# Patient Record
Sex: FEMALE | Race: BLACK OR AFRICAN AMERICAN | NOT HISPANIC OR LATINO
[De-identification: names, ages, dates, MRNs, and addresses within clinical notes are randomized per-mention and may not be internally consistent; named-entity substitution may affect disease eponyms.]

---

## 2020-01-27 PROBLEM — Z00.00 ENCOUNTER FOR PREVENTIVE HEALTH EXAMINATION: Status: ACTIVE | Noted: 2020-01-27

## 2020-02-13 ENCOUNTER — APPOINTMENT (OUTPATIENT)
Dept: GASTROENTEROLOGY | Facility: CLINIC | Age: 60
End: 2020-02-13
Payer: COMMERCIAL

## 2020-02-13 VITALS
HEIGHT: 64 IN | WEIGHT: 161 LBS | SYSTOLIC BLOOD PRESSURE: 130 MMHG | HEART RATE: 81 BPM | DIASTOLIC BLOOD PRESSURE: 90 MMHG | BODY MASS INDEX: 27.49 KG/M2

## 2020-02-13 DIAGNOSIS — R14.0 ABDOMINAL DISTENSION (GASEOUS): ICD-10-CM

## 2020-02-13 DIAGNOSIS — R10.9 UNSPECIFIED ABDOMINAL PAIN: ICD-10-CM

## 2020-02-13 PROCEDURE — 99215 OFFICE O/P EST HI 40 MIN: CPT

## 2020-02-13 NOTE — HISTORY OF PRESENT ILLNESS
[FreeTextEntry1] : recent onset of abdominal pain somewhat variable, upper abdomen, infrequently lower abdomen, no change in bowel habits or constipation, occasionally some heartburn, and a variable intensity. Will occur once. No change in appetite or weight loss or new medications.\par Tends to have regular bowel movements except when she takes he comes Probably due to calcium carbonate.\par \par Colonoscopy performed May 26, 2015 and was negative. I had recommended at that time a followup colonoscopy at a 7-10 year interval\par no specific symptoms highly suggestive of reflux although she tends to have some heartburn and also some upper abdominal pain and burning when she has gone several hours longer without eating. This may occur from her work schedule, but she does seemed to do better when she eats on a more regular basis

## 2020-02-13 NOTE — ASSESSMENT
[FreeTextEntry1] : 1.  recent onset of upper gi symptoms, with variable upper abdominal discomfort, some occasional heartburn, symptoms are worse when she hasn’t eaten, and burning in mid abdomen, and she feels better if she eats more regularly\par 2. has had gyn surgery for a cyst, ovarian, but no hysterectomy\par 3.  saw her gyn, dr Lauren Davila, and the exam was negative...no imaging done\par \par plan\par 1.  abdominal and pelvic ultrasound, because of symptoms\par 2.  also, arrange egd\par 3.  more fiber in diet...\par DR GREEN'S COMMON SENSE RECOMMENDATIONS FOR IMPROVING CONSTIPATION,  WITH OR WITHOUT PRESCRIPTION MEDICATION\par \par 1.  slowly increase dietary fiber\par 2.  breakfast is especially important for good bowel regime\par 3.  high fiber breakfast cereal such as Kashi or Fiber 1 is a useful way to increase dietary fiber\par 4.  hot beverage or hot cereal may also be helpful at breakfast\par 5.  fluid intake to avoid dehydration;  eight glasses of non caffeinated, non alcoholic fluids per day [64 fll oz]\par 6.  prunes can be helpful; 3-6 per day [alternative is prune juice]\par 7.  add unprocessed bran to foods, beginning with one teaspoon per day\par 8   add flaxseed to foods, starting with one tablespoon and increasing slowly\par \par patient seems to have a lot of veggies...\par she feels she is overkilling on fiber recently\par \par i am advising egd\par \par could be ibs\par try PROBIOTIC..ALIGN, OR CULTURELL, OR DIGESTIVE ADVANTAGE, OR FLORASTOR\par \par TRY IBGARD, which is a tincture of peppermint, and it can be soothing, start one capsule three times per day, and increase up to two capsules three times per day\par \par set up EGD or gastroscopy\par \par but the ultrasound first\par \par AS WE OBTAIN INFORMED CONSENT FOR COLONOSCOPY, UPPER ENDOSCOPY [EGD], OR BOTH PROCEDURES TOGETHER;\par \par As with all procedures, there are risks of which the patient should be aware\par \par 1.  Anesthesia; deep sedation with Propofol;  there is a small risk of aspiration and pulmonary infection.  The Anesthesiologist meets with the patient the morning of the procedure to discuss in more detail\par \par 2.  risk of bleeding; from removal of a polyp, or rarely, from biopsies, 1 % or less\par \par 3.  risk of injury or perforation of the colon or upper GI tract; one in a thousand or less,  from removing a polyp or from advancing the instrument\par \par \par More than 50% of the face to face time was devoted to counseling and /or coordination of care.  THis coordination of care may have included reviewing other medical notes and reports, and communicating with other health professionals\par \par \par

## 2020-02-13 NOTE — PHYSICAL EXAM
[Bowel Sounds] : normal bowel sounds [Abdomen Soft] : soft [Abdomen Tenderness] : non-tender [] : no hepato-splenomegaly [Abdomen Mass (___ Cm)] : no abdominal mass palpated [Normal Sphincter Tone] : normal sphincter tone [No Rectal Mass] : no rectal mass [Occult Blood Positive] : stool was negative for occult blood [FreeTextEntry1] : stool is rather considerable but not hard, in ampulla, heme negative

## 2020-02-21 ENCOUNTER — RESULT REVIEW (OUTPATIENT)
Age: 60
End: 2020-02-21

## 2020-03-03 ENCOUNTER — RECORD ABSTRACTING (OUTPATIENT)
Age: 60
End: 2020-03-03

## 2020-03-03 DIAGNOSIS — Z82.49 FAMILY HISTORY OF ISCHEMIC HEART DISEASE AND OTHER DISEASES OF THE CIRCULATORY SYSTEM: ICD-10-CM

## 2020-03-03 DIAGNOSIS — Z78.9 OTHER SPECIFIED HEALTH STATUS: ICD-10-CM

## 2020-03-03 DIAGNOSIS — Z83.3 FAMILY HISTORY OF DIABETES MELLITUS: ICD-10-CM

## 2020-03-03 DIAGNOSIS — C50.919 MALIGNANT NEOPLASM OF UNSPECIFIED SITE OF UNSPECIFIED FEMALE BREAST: ICD-10-CM

## 2020-03-03 DIAGNOSIS — D50.9 IRON DEFICIENCY ANEMIA, UNSPECIFIED: ICD-10-CM

## 2020-03-03 RX ORDER — AMLODIPINE BESYLATE 2.5 MG/1
2.5 TABLET ORAL
Refills: 0 | Status: ACTIVE | COMMUNITY

## 2020-03-03 RX ORDER — MULTIVITAMIN
TABLET ORAL
Refills: 0 | Status: ACTIVE | COMMUNITY

## 2020-03-03 RX ORDER — CHOLECALCIFEROL (VITAMIN D3) 25 MCG
TABLET ORAL
Refills: 0 | Status: ACTIVE | COMMUNITY

## 2020-03-03 RX ORDER — CHLORHEXIDINE GLUCONATE 4 %
325 (65 FE) LIQUID (ML) TOPICAL
Refills: 0 | Status: ACTIVE | COMMUNITY

## 2020-06-05 ENCOUNTER — APPOINTMENT (OUTPATIENT)
Dept: GASTROENTEROLOGY | Facility: CLINIC | Age: 60
End: 2020-06-05

## 2024-04-29 ENCOUNTER — APPOINTMENT (OUTPATIENT)
Dept: GASTROENTEROLOGY | Facility: CLINIC | Age: 64
End: 2024-04-29
Payer: COMMERCIAL

## 2024-04-29 VITALS
WEIGHT: 164 LBS | SYSTOLIC BLOOD PRESSURE: 118 MMHG | HEART RATE: 67 BPM | BODY MASS INDEX: 28.15 KG/M2 | OXYGEN SATURATION: 97 % | DIASTOLIC BLOOD PRESSURE: 74 MMHG

## 2024-04-29 DIAGNOSIS — I10 ESSENTIAL (PRIMARY) HYPERTENSION: ICD-10-CM

## 2024-04-29 DIAGNOSIS — Z12.11 ENCOUNTER FOR SCREENING FOR MALIGNANT NEOPLASM OF COLON: ICD-10-CM

## 2024-04-29 PROCEDURE — 99203 OFFICE O/P NEW LOW 30 MIN: CPT

## 2024-04-29 NOTE — ASSESSMENT
[FreeTextEntry1] : patient is here to discuss having colonoscopy, which would be first in approx ten years, as a screening  She is average risk for colon cancer there is a hx of DCIS treated by Lumpectomy, right breast  no hx of angina never has smoked no diabetes no blood thinners

## 2024-04-29 NOTE — HISTORY OF PRESENT ILLNESS
[FreeTextEntry1] : patient has presented to discuss having a colon cancer screen her last colonoscopy was greater than ten years ago there is no fh of colon cancer, no fh of colon polyps patient at one time had some gaseous discomfort, but this has responded to a lactose free diet also, previous reflux, now gone   ph;  DCIS right breast, treated with Lumpectomy and gyn;  fibroids, small, uterus  also, essential hbp, 2.5 mgm amlodipine and hctz 12.5 mgm

## 2024-05-17 RX ORDER — SODIUM PICOSULFATE, MAGNESIUM OXIDE, AND ANHYDROUS CITRIC ACID 12; 3.5; 1 G/175ML; G/175ML; MG/175ML
10-3.5-12 MG-GM LIQUID ORAL
Qty: 2 | Refills: 1 | Status: ACTIVE | COMMUNITY
Start: 2024-05-17 | End: 1900-01-01

## 2024-05-22 RX ORDER — SODIUM PICOSULFATE, MAGNESIUM OXIDE, AND ANHYDROUS CITRIC ACID 12; 3.5; 1 G/175ML; G/175ML; MG/175ML
10-3.5-12 MG-GM LIQUID ORAL
Qty: 2 | Refills: 1 | Status: ACTIVE | COMMUNITY
Start: 2024-05-22 | End: 1900-01-01

## 2024-06-28 ENCOUNTER — APPOINTMENT (OUTPATIENT)
Dept: GASTROENTEROLOGY | Facility: HOSPITAL | Age: 64
End: 2024-06-28

## 2024-06-29 ENCOUNTER — TRANSCRIPTION ENCOUNTER (OUTPATIENT)
Age: 64
End: 2024-06-29